# Patient Record
Sex: MALE | Race: WHITE
[De-identification: names, ages, dates, MRNs, and addresses within clinical notes are randomized per-mention and may not be internally consistent; named-entity substitution may affect disease eponyms.]

---

## 2019-08-19 ENCOUNTER — HOSPITAL ENCOUNTER (EMERGENCY)
Dept: HOSPITAL 25 - UCCORT | Age: 69
Discharge: HOME HEALTH | End: 2019-08-19
Payer: MEDICARE

## 2019-08-19 VITALS — SYSTOLIC BLOOD PRESSURE: 119 MMHG | DIASTOLIC BLOOD PRESSURE: 83 MMHG

## 2019-08-19 DIAGNOSIS — Z88.0: ICD-10-CM

## 2019-08-19 DIAGNOSIS — N39.0: Primary | ICD-10-CM

## 2019-08-19 PROCEDURE — 99202 OFFICE O/P NEW SF 15 MIN: CPT

## 2019-08-19 PROCEDURE — 81003 URINALYSIS AUTO W/O SCOPE: CPT

## 2019-08-19 PROCEDURE — G0463 HOSPITAL OUTPT CLINIC VISIT: HCPCS

## 2019-08-19 PROCEDURE — 87077 CULTURE AEROBIC IDENTIFY: CPT

## 2019-08-19 PROCEDURE — 87186 SC STD MICRODIL/AGAR DIL: CPT

## 2019-08-19 PROCEDURE — 87086 URINE CULTURE/COLONY COUNT: CPT

## 2019-08-19 NOTE — UC
Complaint Male HPI





- HPI Summary


HPI Summary: 


68-year-old male comes in with a chief complaint of burning with urination and 

feeling ill 4 days.  Initially started with burning with urination.  Last 

couple days he started to feel ill.  Feels lightheaded when he stands up.  He 

has had chills.  he's had some suprapubic discomfort he does have a history of 

BPH.  He is not on any medication for BPH.  Denies any flank pain.





- History of Current Complaint


Chief Complaint: UCGU


Stated Complaint: URINARY COMPLAINT


Time Seen by Provider: 08/19/19 09:36


Pain Intensity: 6





- Allergies/Home Medications


Allergies/Adverse Reactions: 


 Allergies











Allergy/AdvReac Type Severity Reaction Status Date / Time


 


Penicillins Allergy  Unknown Verified 08/19/19 08:58





   Reaction  





   Details  











Home Medications: 


 Home Medications





Acetaminophen [APAP] 650 mg PO Q4H PRN 08/19/19 [History Confirmed 08/19/19]











PMH/Surg Hx/FS Hx/Imm Hx


Previously Healthy: Yes - BPH





- Surgical History


Surgical History: Yes


Surgery Procedure, Year, and Place: R testicle rupture





- Family History


Known Family History: Positive: Non-Contributory





- Social History


Alcohol Use: Rare


Substance Use Type: Marijuana


Substance Use Comment - Amount & Last Used: occasional


Smoking Status (MU): Never Smoked Tobacco





Review of Systems


All Other Systems Reviewed And Are Negative: Yes


Constitutional: Positive: Chills, Fatigue


Skin: Positive: Negative


Eyes: Positive: Negative


ENT: Positive: Negative


Respiratory: Positive: Negative


Cardiovascular: Positive: Negative


Gastrointestinal: Positive: Other - SEE HPI


Genitourinary: Positive: Dysuria, Frequency, Urgency, Vaginal/Penile Burning


Motor: Positive: Weakness - SEE HPI


Neurovascular: Positive: Negative


Musculoskeletal: Positive: Negative


Neurological: Positive: Negative


Psychological: Positive: Negative


Is Patient Immunocompromised?: No





Physical Exam


Triage Information Reviewed: Yes


Appearance: No Pain Distress, Well-Nourished, Ill-Appearing - MILD


Vital Signs: 


 Initial Vital Signs











Temp  97.5 F   08/19/19 09:04


 


Pulse  113   08/19/19 09:04


 


Resp  20   08/19/19 09:04


 


BP  119/83   08/19/19 09:04


 


Pulse Ox  98   08/19/19 09:04











Vital Signs Reviewed: Yes


Eye Exam: Normal


Eyes: Positive: Conjunctiva Clear


Neck: Positive: Supple


Respiratory: Positive: Lungs clear, Normal breath sounds, No respiratory 

distress


Cardiovascular: Positive: Tachycardia


Abdomen Description: Positive: Soft.  Negative: CVA Tenderness (R), CVA 

Tenderness (L)


Bowel Sounds: Positive: Present


Musculoskeletal: Positive: Strength Intact, ROM Intact


Neurological: Positive: Alert, Muscle Tone Normal


Psychological: Positive: Age Appropriate Behavior


Skin Exam: Normal





 Complaint Male Course/Dx





- Course


Course Of Treatment: 


Because the patient is tachycardic and he is feeling ill I was concerned the 

patient would benefit from IV antibiotics and IV fluids and therefore I 

recommended further evaluation and care and emergency Department patient wishes 

to go by POV.  Discussed with Dr. Ruiz at the [place] emergency department





- Differential Dx/Diagnosis


Provider Diagnosis: 


 UTI (urinary tract infection)








Discharge





- Sign-Out/Discharge


Documenting (check all that apply): Patient Departure


All imaging exams completed and their final reports reviewed: No Studies





- Discharge Plan


Condition: Stable


Disposition: HOME-RECOMMEND TO ED


Patient Education Materials:  Urinary Tract Infection in Men (ED)


Referrals: 


[facility] [place] [facility] [Outside]


Additional Instructions: 


GO DIRECTLY TO THE EMERGENCY DEPARTMENT FOR FURTHER EVALUATION.








- Billing Disposition and Condition


Condition: STABLE


Disposition: Home-Recommend to ED